# Patient Record
Sex: MALE | Race: WHITE | NOT HISPANIC OR LATINO | URBAN - NONMETROPOLITAN AREA
[De-identification: names, ages, dates, MRNs, and addresses within clinical notes are randomized per-mention and may not be internally consistent; named-entity substitution may affect disease eponyms.]

---

## 2023-03-28 ENCOUNTER — OFFICE (OUTPATIENT)
Dept: URBAN - NONMETROPOLITAN AREA CLINIC 1 | Facility: CLINIC | Age: 68
End: 2023-03-28

## 2023-03-28 DIAGNOSIS — N18.9 CHRONIC KIDNEY DISEASE, UNSPECIFIED: ICD-10-CM

## 2023-03-28 DIAGNOSIS — K57.20 DIVERTICULITIS OF LARGE INTESTINE WITH PERFORATION AND ABSCE: ICD-10-CM

## 2023-03-28 DIAGNOSIS — I10 ESSENTIAL (PRIMARY) HYPERTENSION: ICD-10-CM

## 2023-03-28 DIAGNOSIS — K21.9 GASTRO-ESOPHAGEAL REFLUX DISEASE WITHOUT ESOPHAGITIS: ICD-10-CM

## 2023-03-28 PROCEDURE — 99204 OFFICE O/P NEW MOD 45 MIN: CPT | Performed by: NURSE PRACTITIONER

## 2023-03-28 NOTE — SERVICEHPINOTES
He presented tot he ER 2/7/23 with abdominal pain, chills and nausea.  His WBC was 14.7 and his CT revealed free air consistent with perforated diverticulitis.  He was treated with IV antibiotics and bowel rest, and subsequently met with surgery. He is referred to us from [facility] for a pre-op colonoscopy.   He was advised to have a colonoscopy after 3 months of recuperation, followed by resection of the diseased portion of his sigmoid colon.  HIs chronic illnesses include HTN, GERD, CKD, migraines, diverticulosis and recurrent diverticulitis.br br His last colonoscopy and EGD were done 2/2020 by Dr. Dunaway.  
br Colon:  
br The terminal ileum was normal. There was a small, sessile polyp noted in the transverse colon.  Moderate diverticulosis was noted in the transverse, descending, and sigmoid colon. Otherwise, the colonic mucosal vascular pattern, folds, and color were all normal with no masses, ulcerations, or colitis. Small internal hemorrhoids were noted on retroflexed view  .  The polyp was histologically normal mucosa.br
EGD: brEsophagus:  The esophageal mucosa was normal with no esophagitis, Dash's esophagus, varices, mass, or stricture.brGE Junction:  1 to 2 centimeter hiatal hernia was noted at the GE junction.brStomach:  Mild erythema and edema was noted throughout the gastric mucosa consistent with probable gastritis.brPylorus:  Normal and patent.brDuodenum:  Mild erythema was noted in the duodenal bulb consistent with probable duodenitis. The 2nd and 3rd portion of the duodenum appeared normal.  Stomach biopsies showed chronic gastritis and H. pylori were present.

## 2023-03-28 NOTE — SERVICENOTES
The risks and benefits for the procedure were discussed and he agrees to proceed.
Bowel prep prescribed, and instructed per nursing.